# Patient Record
Sex: MALE
[De-identification: names, ages, dates, MRNs, and addresses within clinical notes are randomized per-mention and may not be internally consistent; named-entity substitution may affect disease eponyms.]

---

## 2021-05-06 PROBLEM — Z00.00 ENCOUNTER FOR PREVENTIVE HEALTH EXAMINATION: Status: ACTIVE | Noted: 2021-05-06

## 2021-05-27 ENCOUNTER — APPOINTMENT (OUTPATIENT)
Dept: RADIATION ONCOLOGY | Facility: CLINIC | Age: 61
End: 2021-05-27
Payer: COMMERCIAL

## 2021-05-27 DIAGNOSIS — I10 ESSENTIAL (PRIMARY) HYPERTENSION: ICD-10-CM

## 2021-05-27 DIAGNOSIS — Z87.891 PERSONAL HISTORY OF NICOTINE DEPENDENCE: ICD-10-CM

## 2021-05-27 DIAGNOSIS — E78.5 HYPERLIPIDEMIA, UNSPECIFIED: ICD-10-CM

## 2021-05-27 DIAGNOSIS — C61 MALIGNANT NEOPLASM OF PROSTATE: ICD-10-CM

## 2021-05-27 DIAGNOSIS — Z92.3 PERSONAL HISTORY OF IRRADIATION: ICD-10-CM

## 2021-05-27 PROCEDURE — 99212 OFFICE O/P EST SF 10 MIN: CPT | Mod: 95

## 2021-05-27 RX ORDER — TADALAFIL 5 MG/1
TABLET, FILM COATED ORAL
Refills: 0 | Status: ACTIVE | COMMUNITY

## 2021-05-27 RX ORDER — OLMESARTAN MEDOXOMIL 40 MG/1
TABLET, FILM COATED ORAL
Refills: 0 | Status: ACTIVE | COMMUNITY

## 2021-05-27 RX ORDER — ATORVASTATIN CALCIUM 80 MG/1
TABLET, FILM COATED ORAL
Refills: 0 | Status: ACTIVE | COMMUNITY

## 2021-05-27 RX ORDER — HYDROCHLOROTHIAZIDE 12.5 MG/1
TABLET ORAL
Refills: 0 | Status: ACTIVE | COMMUNITY

## 2021-05-27 RX ORDER — EMTRICITABINE AND TENOFOVIR ALAFENAMIDE 120; 15 MG/1; MG/1
TABLET ORAL
Refills: 0 | Status: ACTIVE | COMMUNITY

## 2021-05-27 RX ORDER — DOLUTEGRAVIR SODIUM 50 MG/1
TABLET, FILM COATED ORAL
Refills: 0 | Status: ACTIVE | COMMUNITY

## 2021-05-27 NOTE — HISTORY OF PRESENT ILLNESS
[Home] : at home, [unfilled] , at the time of the visit. [Medical Office: ([facility])___] : at the medical office located in  [Verbal consent obtained from patient] : the patient, [unfilled] [FreeTextEntry1] : Mr. 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 On 2/6/19, he completed a course of definitive stereotactic body radiation therapy with [employer] technology to the prostate for a total of 3625 cGy.  He declined ADT as a component of his treatment.  \par \par 5/27/21- FOLLOW UP\par Mr. Keating follows up today via phone. He notes that he is feeling well, with no appreciable symptomatology related to his definitive radiation therapy.  Specifically, he notes baseline urine function with nocturia x0-1 and no longer takes flomax 0.4mg.  He denies dysuria or incontinence.  He denies blood in the urine.  He has no blood or mucous in the stool, and denies rectal pain. He has good erections.  He was scheduled to meet with Dr. Ding for examination and PSA draw tomorrow but notes he will have to reschedule as he is moving.  HIs PSA was 0.535ng/ml on 11/4/20; it was <0.09ng/ml on 1/22/21.  \par \par PSA trend:\par 6/11/19- 1.6 ng/mL with associated Testosterone of 332 ng/dL\par 1/20/2020- 0.532 ng/mL with associated Testosterone of 257 ng/dL\par 11/4/2020- 0.535 ng/mL\par 1/22/21- <0.09 ng/mL\par \par \par

## 2021-05-27 NOTE — DISEASE MANAGEMENT
[X] : TX [0-10] : 0 -10 ng/mL [7(3+4)] : Martell Score 7(3+4) [Treatment with radiation therapy] : Treatment with radiation therapy [EBRT] : EBRT [BiopsyDate] : 9/28/18 [MeasuredProstateVolume] : 55 [TotalCores] : 12 [TotalPositiveCores] : 4 [MaxCoreInvolvement] : 90 [II] : II [RadiationCompletedDate] : 2/6/19 [EBRTDose] : 3625 cGy [EBRTFractions] : 5

## 2023-10-01 PROBLEM — Z92.3 HISTORY OF RADIATION THERAPY: Status: RESOLVED | Noted: 2021-05-27 | Resolved: 2023-10-01
